# Patient Record
(demographics unavailable — no encounter records)

---

## 2025-06-06 NOTE — PHYSICAL EXAM
[Nasal Endoscopy Performed] : nasal endoscopy was performed, see procedure section for findings [Normal] : no neck adenopathy [de-identified] : engorged [de-identified] : gait steady

## 2025-06-06 NOTE — PROCEDURE
[Posterior Lesion] : posterior lesion [Anterior rhinoscopy insufficient to account for symptoms] : anterior rhinoscopy insufficient to account for symptoms [Topical Lidocaine] : topical lidocaine [Flexible Endoscope] : examined with the flexible endoscope [Oxymetazoline HCl] : oxymetazoline HCl [Serial Number: ___] : Serial Number: [unfilled] [Allergic] : allergic signs [] : hypertrophy present bilateral [Normal] : the nasopharynx was normal [FreeTextEntry6] : done for nasal congestion and drainage, could not see with speculum found to have inflammation consistent with allergic rhnitis  Mucosa- b inflammation consistent with allergies Polyps- b nl Inferior turbinates- b nl Middle turbinates- b nl Superior turbinate- b nl Inferior meatus- b nl Middle meatus- b nl Superior meatus- b nl Spheno-ethmoidal recess- b nl [de-identified] : done for nasal congestion and drainage to ro sinusitis could not see with speculum

## 2025-06-06 NOTE — HISTORY OF PRESENT ILLNESS
[de-identified] : 51 y/o F presents with persistent nasal congestion, ear fullness, postnasal drip, and intermittent productive cough x 2 months. Symptoms onset feeling similar to a uri with a sore throat early April. Feels that green mucus is postnasal, not from chest. Sinus pressure as well. Denies fevers, smell/taste changes. Seen by another ENT in [place] 3 weeks ago, given Augmentin with only mild improvement. Used Nasonex for 1 week without significant benefit. Denies history of seasonal allergies. No prior history of sinus issues. Travels often for work. No fh or sh relevant to cc.

## 2025-06-06 NOTE — PROCEDURE
[Posterior Lesion] : posterior lesion [Anterior rhinoscopy insufficient to account for symptoms] : anterior rhinoscopy insufficient to account for symptoms [Topical Lidocaine] : topical lidocaine [Oxymetazoline HCl] : oxymetazoline HCl [Flexible Endoscope] : examined with the flexible endoscope [Serial Number: ___] : Serial Number: [unfilled] [Allergic] : allergic signs [] : hypertrophy present bilateral [Normal] : the nasopharynx was normal [de-identified] : done for nasal congestion and drainage to ro sinusitis could not see with speculum [FreeTextEntry6] : done for nasal congestion and drainage, could not see with speculum found to have inflammation consistent with allergic rhnitis  Mucosa- b inflammation consistent with allergies Polyps- b nl Inferior turbinates- b nl Middle turbinates- b nl Superior turbinate- b nl Inferior meatus- b nl Middle meatus- b nl Superior meatus- b nl Spheno-ethmoidal recess- b nl

## 2025-06-06 NOTE — ASSESSMENT
[FreeTextEntry1] : chronic congestion, postnasal drip - r/o chronic sinusitis -montelukast - no h/o major depression, resume nasonex -allegra-d PRN -ct sinus wo contrast to assure no sinusitis as she still notes discolored drainage rec see her internist to examine her lungs  RTC with CT sinus; sooner if any issues

## 2025-06-06 NOTE — PHYSICAL EXAM
[Nasal Endoscopy Performed] : nasal endoscopy was performed, see procedure section for findings [Normal] : no neck adenopathy [de-identified] : engorged [de-identified] : gait steady

## 2025-06-06 NOTE — HISTORY OF PRESENT ILLNESS
[de-identified] : 51 y/o F presents with persistent nasal congestion, ear fullness, postnasal drip, and intermittent productive cough x 2 months. Symptoms onset feeling similar to a uri with a sore throat early April. Feels that green mucus is postnasal, not from chest. Sinus pressure as well. Denies fevers, smell/taste changes. Seen by another ENT in [place] 3 weeks ago, given Augmentin with only mild improvement. Used Nasonex for 1 week without significant benefit. Denies history of seasonal allergies. No prior history of sinus issues. Travels often for work. No fh or sh relevant to cc.

## 2025-07-08 NOTE — HISTORY OF PRESENT ILLNESS
[de-identified] : 3 week follow up visit for this 51 y/o F. During last visit, she was dx'd with sinusitis and tx'd with bactrim. She was previously placed on nasonex, antihistamine-decongestant oral, singulair, and augmentin for tx of sinusitis. Reports significant improvement of bilateral maxillary sinus pressure and swelling. Flew recently - had no issues with ear/sinus pressure during the flight. She reports her nose feels back to normal.

## 2025-07-08 NOTE — HISTORY OF PRESENT ILLNESS
[de-identified] : 3 week follow up visit for this 53 y/o F. During last visit, she was dx'd with sinusitis and tx'd with bactrim. She was previously placed on nasonex, antihistamine-decongestant oral, singulair, and augmentin for tx of sinusitis. Reports significant improvement of bilateral maxillary sinus pressure and swelling. Flew recently - had no issues with ear/sinus pressure during the flight. She reports her nose feels back to normal.

## 2025-07-08 NOTE — PROCEDURE
[None] : none [Flexible Endoscope] : examined with the flexible endoscope [Posterior Lesion] : posterior lesion [Anterior rhinoscopy insufficient to account for symptoms] : anterior rhinoscopy insufficient to account for symptoms [Serial Number: ___] : Serial Number: [unfilled] [Normal] : the nasopharynx was normal [de-identified] : done to make sure there was no further sinus drainage, could not see posterior nasal cavity with speculum [FreeTextEntry6] : clear  Mucosa- b nl Mucous- b nl Polyps- b nl  Inferior turbinate- b nl Middle turbinate- b nl Superior turbinate- b nl Inferior meatus- b nl Middle meatus- b nl Superior meatus- b nl Spheno-ethmoidal recess- b nl anterior rhinoscopy insuffcient to account for symptoms

## 2025-07-08 NOTE — PROCEDURE
[None] : none [Flexible Endoscope] : examined with the flexible endoscope [Posterior Lesion] : posterior lesion [Anterior rhinoscopy insufficient to account for symptoms] : anterior rhinoscopy insufficient to account for symptoms [Serial Number: ___] : Serial Number: [unfilled] [Normal] : the nasopharynx was normal [de-identified] : done to make sure there was no further sinus drainage, could not see posterior nasal cavity with speculum [FreeTextEntry6] : clear  Mucosa- b nl Mucous- b nl Polyps- b nl  Inferior turbinate- b nl Middle turbinate- b nl Superior turbinate- b nl Inferior meatus- b nl Middle meatus- b nl Superior meatus- b nl Spheno-ethmoidal recess- b nl anterior rhinoscopy insuffcient to account for symptoms